# Patient Record
Sex: FEMALE | Race: WHITE | Employment: FULL TIME | URBAN - METROPOLITAN AREA
[De-identification: names, ages, dates, MRNs, and addresses within clinical notes are randomized per-mention and may not be internally consistent; named-entity substitution may affect disease eponyms.]

---

## 2022-10-20 DIAGNOSIS — M25.561 RIGHT KNEE PAIN, UNSPECIFIED CHRONICITY: Primary | ICD-10-CM

## 2022-10-24 ENCOUNTER — OFFICE VISIT (OUTPATIENT)
Dept: ORTHOPEDICS | Facility: CLINIC | Age: 32
End: 2022-10-24
Payer: COMMERCIAL

## 2022-10-24 VITALS — HEIGHT: 66 IN | WEIGHT: 140 LBS | BODY MASS INDEX: 22.5 KG/M2

## 2022-10-24 DIAGNOSIS — M25.561 RIGHT KNEE PAIN, UNSPECIFIED CHRONICITY: Primary | ICD-10-CM

## 2022-10-24 PROCEDURE — 99202 OFFICE O/P NEW SF 15 MIN: CPT | Performed by: ORTHOPAEDIC SURGERY

## 2022-10-24 NOTE — LETTER
10/24/2022         RE: Samantha Servin  [address] [facility] [address]        Dear Colleague,    Thank you for referring your patient, Samantha Servin, to the [facility] [place]. Please see a copy of my visit note below.    CHIEF CONCERN: Right Knee Popping and Grinding    HISTORY:   32 year old female with history for right knee PVNS removal in 2016 performed by Dr. Rascon, presents with 6 to 8 weeks of right knee popping and grinding. Notices this after climbing stairs or getting into her pants. No acute injuries or events recently. Describes mild pain associated with these events but is not bothering her. Has also had sensation of catching of the right knee. This occurs randomly and last for a few seconds before resolving. Has still been able to continue to perform her daily activities of living without issue.    PAST MEDICAL HISTORY: (Reviewed with the patient and in the [facility] medical record)  1. Anxiety  2. PONV    PAST SURGICAL HISTORY: (Reviewed with the patient and in the [facility] medical record)  1. Arthroscopy L knee with patellar realignment  2. Arthroscopy R knee with PVNS removal    MEDICATIONS: (Reviewed with the patient and in the [facility] medical record)    Notable medications include: None    ALLERGIES: (Reviewed with the patient and in the [facility] medical record)  1. None      SOCIAL HISTORY: (Reviewed with the patient and in the medical record)  --Tobacco: None  --Occupation: Not asked  --Avocation/Sport: Not asked    FAMILY HISTORY: (Reviewed with the patient and in the medical record)  -- No family history of bleeding, clotting, or difficulty with anesthesia    REVIEW OF SYSTEMS: (Reviewed with the patient and on the health intake form)  -- A comprehensive 10 point review of systems was conducted and is negative except as noted in the HPI    EXAM:     General: Awake, Alert and Oriented, No acute Distress. Articulate and Interactive    Body mass index is 22.6 kg/m .    Right  Lower extremity :    Skin is Warm and Well perfused, no suggestion of infection    Knee range of motion: 0 degrees of extension, 160 degrees of flexion    No tenderness with palpation of medial aspect of knee    EHL/FHL/TA/GS 5/5    Sensation intact L3-S1    2+ Dorsalis Pedis Pulse    IMAGING:      ASSESSMENT:  1. Right knee Locking, potentially due to cartilage damage or loose foreign body    PLAN:  1. Recommend new radiographs MRI to evaluate for right knee for pathology which may explain patient symptoms.  The differential diagnosis includes osteochondral loose body, displaced meniscus tear or recurrent pigmented villonodular synovitis  2. Patient may be pregnant, so we have decided to wait for definitive management until it is determined the patient is not pregnant or, if she is, then waiting until after the birth of the child.  3. The patient voiced understanding this and will let us know in the next few weeks        Again, thank you for allowing me to participate in the care of your patient.        Sincerely,        Sudhakar Rascon MD

## 2022-10-24 NOTE — NURSING NOTE
"Reason For Visit:   Chief Complaint   Patient presents with     RECHECK     DOS: 5/2/16 right knee arthroscopy, chondroplasty, ACI biopsy, removal of posterior knee tumor     ?  No  Occupation: [employer]  Currently working? Yes.  Work status?  Full time.  Date of surgery: 5/2/16  Type of surgery: Right Knee Exam Under Anesthesia, Right Knee Arthroscopy, Chrondroplasty, ACI Biopsy, Removal Right Posterior Knee Tumor    Over the last 6-8 weeks she has noticed a popping and grinding in her knee while going up stairs, getting up from the ground, after exercise. The majority of her pain is on the anterior knee. No injury she recalls, she was doing well since surgery.     SANE Score  Left Knee: 95  Right Knee: 75    Pain Assessment  Patient Currently in Pain: Yes  0-10 Pain Scale: 0  Primary Pain Location: Knee    Ht 1.676 m (5' 6\")   Wt 63.5 kg (140 lb)   BMI 22.60 kg/m         No Known Allergies    Current Outpatient Medications   Medication     Acetaminophen (TYLENOL EXTRA STRENGTH PO)     etonogestrel-ethinyl estradiol (NUVARING) 0.12-0.015 MG/24HR vaginal ring     No current facility-administered medications for this visit.         Magui Henao, ATC    "

## 2022-10-24 NOTE — PROGRESS NOTES
CHIEF CONCERN: Right Knee Popping and Grinding    HISTORY:   32 year old female with history for right knee PVNS removal in 2016 performed by Dr. Rascon, presents with 6 to 8 weeks of right knee popping and grinding. Notices this after climbing stairs or getting into her pants. No acute injuries or events recently. Describes mild pain associated with these events but is not bothering her. Has also had sensation of catching of the right knee. This occurs randomly and last for a few seconds before resolving. Has still been able to continue to perform her daily activities of living without issue.    PAST MEDICAL HISTORY: (Reviewed with the patient and in the [facility] medical record)  1. Anxiety  2. PONV    PAST SURGICAL HISTORY: (Reviewed with the patient and in the [facility] medical record)  1. Arthroscopy L knee with patellar realignment  2. Arthroscopy R knee with PVNS removal    MEDICATIONS: (Reviewed with the patient and in the [facility] medical record)    Notable medications include: None    ALLERGIES: (Reviewed with the patient and in the [facility] medical record)  1. None      SOCIAL HISTORY: (Reviewed with the patient and in the medical record)  --Tobacco: None  --Occupation: Not asked  --Avocation/Sport: Not asked    FAMILY HISTORY: (Reviewed with the patient and in the medical record)  -- No family history of bleeding, clotting, or difficulty with anesthesia    REVIEW OF SYSTEMS: (Reviewed with the patient and on the health intake form)  -- A comprehensive 10 point review of systems was conducted and is negative except as noted in the HPI    EXAM:     General: Awake, Alert and Oriented, No acute Distress. Articulate and Interactive    Body mass index is 22.6 kg/m .    Right Lower extremity :    Skin is Warm and Well perfused, no suggestion of infection    Knee range of motion: 0 degrees of extension, 160 degrees of flexion    No tenderness with palpation of medial aspect of knee    EHL/FHL/TA/GS 5/5    Sensation intact  L3-S1    2+ Dorsalis Pedis Pulse    IMAGING:      ASSESSMENT:  1. Right knee Locking, potentially due to cartilage damage or loose foreign body    PLAN:  1. Recommend new radiographs MRI to evaluate for right knee for pathology which may explain patient symptoms.  The differential diagnosis includes osteochondral loose body, displaced meniscus tear or recurrent pigmented villonodular synovitis  2. Patient may be pregnant, so we have decided to wait for definitive management until it is determined the patient is not pregnant or, if she is, then waiting until after the birth of the child.  3. The patient voiced understanding this and will let us know in the next few weeks

## 2022-11-03 ENCOUNTER — TRANSFERRED RECORDS (OUTPATIENT)
Dept: HEALTH INFORMATION MANAGEMENT | Facility: CLINIC | Age: 32
End: 2022-11-03

## 2022-11-09 ENCOUNTER — VIRTUAL VISIT (OUTPATIENT)
Dept: ORTHOPEDICS | Facility: CLINIC | Age: 32
End: 2022-11-09
Payer: COMMERCIAL

## 2022-11-09 DIAGNOSIS — M25.561 CHRONIC PAIN OF RIGHT KNEE: Primary | ICD-10-CM

## 2022-11-09 DIAGNOSIS — G89.29 CHRONIC PAIN OF RIGHT KNEE: Primary | ICD-10-CM

## 2022-11-09 PROCEDURE — 99213 OFFICE O/P EST LOW 20 MIN: CPT | Mod: TEL | Performed by: ORTHOPAEDIC SURGERY

## 2022-11-09 NOTE — LETTER
11/9/2022         RE: Samantha Caballero  [address] [facility] [address]        Dear Colleague,    Thank you for referring your patient, Samantha Caballero, to the [facility] [place]. Please see a copy of my visit note below.    Samantha is a 32 year old who is being evaluated via a billable telephone visit.      At that time we reviewed that back in 2016 I performed an arthroscopicI had a chance to see her in my orthopedic clinic on October 24 of this year./Open pigmented villonodular synovitis excision.  She also has areas of chondrosis.  She states that she was not have any any specific events though she had 6 to 8 weeks of knee pain which we discussed at her last visit.  In light of this information we elected to get a new radiographs as well as an MRI to evaluate for recurrence of a loose piece, displaced meniscus tear or recurrent PVNS.  At that time the patient was concerned that she may have been pregnant so we held off on imaging until there was further clarity on this.  She then ultimately obtain the images and she now returns for telephone visit to discuss the results.    No examination was completed today as this was a telephone visit    Plain radiographs today show overall intact medial and lateral tibiofemoral compartments.  Intact patellofemoral compartment.  Vascular clips are noted in the posterior aspect of the knee.  Possibly trace degenerative changes are noted throughout the patellofemoral compartment there is no full-thickness cartilage loss.    MRI was obtained and reviewed by myself.  It does demonstrate some areas of chondrosis about the patellofemoral compartment    No evidence of recurrent pigmented villonodular synovitis.  Intact medial lateral meniscus and intact tibiofemoral compartments.    Clinical assessment: Patellofemoral chondrosis    No evidence of recurrent pigmented villonodular synovitis    Plan: Long discussion with the patient.  Reviewed the  diagnosis potential treatment options.  This time I think there is no role for urgent surgical intervention.  Overall her knee appears to be hanging in there okay.  Our goal should be to do things such as activity modification, oral anti-inflammatories, maximization of nonsurgical management.  I guess I would consider corticosteroid injection management if she fails to see lasting improvement.  I do think she would be a candidate for arthroscopic evaluation if she otherwise fails these things but I do not think that there is any urgent role for surgical intervention.      Again, thank you for allowing me to participate in the care of your patient.        Sincerely,        Sudhakar Rascon MD

## 2022-11-09 NOTE — PROGRESS NOTES
Samantha is a 32 year old who is being evaluated via a billable telephone visit.      At that time we reviewed that back in 2016 I performed an arthroscopicOpen pigmented villonodular synovitis excision of her right knee.  I had a chance to see her in my orthopedic clinic on October 24 of this year. She also has areas of chondrosis.  She states that she was not have any any specific events though she had 6 to 8 weeks of knee pain which we discussed at her last visit.  In light of this information we elected to get a new radiographs as well as an MRI to evaluate for recurrence of a loose piece, displaced meniscus tear or recurrent PVNS.  At that time the patient was concerned that she may have been pregnant so we held off on imaging until there was further clarity on this.  She then ultimately obtain the images and she now returns for telephone visit to discuss the results.    No examination was completed today as this was a telephone visit    Plain radiographs today show overall intact medial and lateral tibiofemoral compartments.  Intact patellofemoral compartment.  Vascular clips are noted in the posterior aspect of the knee.  Possibly trace degenerative changes are noted throughout the patellofemoral compartment there is no full-thickness cartilage loss.    MRI was obtained and reviewed by myself.  It does demonstrate some areas of chondrosis about the patellofemoral compartment    No evidence of recurrent pigmented villonodular synovitis.  Intact medial lateral meniscus and intact tibiofemoral compartments.    Clinical assessment: Right patellofemoral chondrosis    Right no evidence of recurrent pigmented villonodular synovitis    Plan: Long discussion with the patient.  Reviewed the diagnosis potential treatment options.  This time I think there is no role for urgent surgical intervention.  Overall her knee appears to be hanging in there okay.  Our goal should be to do things such as activity modification, oral  anti-inflammatories, maximization of nonsurgical management.  I guess I would consider corticosteroid injection management if she fails to see lasting improvement.  I do think she would be a candidate for arthroscopic evaluation if she otherwise fails these things but I do not think that there is any urgent role for surgical intervention.      What phone number would you like to be contacted at? [phone]  How would you like to obtain your AVS? [place]  Phone call duration: 15 minutes; total time 25 minutes

## 2022-12-26 ENCOUNTER — HEALTH MAINTENANCE LETTER (OUTPATIENT)
Age: 32
End: 2022-12-26

## 2024-02-04 ENCOUNTER — HEALTH MAINTENANCE LETTER (OUTPATIENT)
Age: 34
End: 2024-02-04

## 2025-03-02 ENCOUNTER — HEALTH MAINTENANCE LETTER (OUTPATIENT)
Age: 35
End: 2025-03-02